# Patient Record
Sex: FEMALE | Race: WHITE | NOT HISPANIC OR LATINO
[De-identification: names, ages, dates, MRNs, and addresses within clinical notes are randomized per-mention and may not be internally consistent; named-entity substitution may affect disease eponyms.]

---

## 2018-11-09 ENCOUNTER — APPOINTMENT (OUTPATIENT)
Dept: PULMONOLOGY | Facility: CLINIC | Age: 58
End: 2018-11-09

## 2019-10-15 ENCOUNTER — APPOINTMENT (OUTPATIENT)
Dept: RHEUMATOLOGY | Facility: CLINIC | Age: 59
End: 2019-10-15

## 2019-10-15 ENCOUNTER — APPOINTMENT (OUTPATIENT)
Dept: PULMONOLOGY | Facility: CLINIC | Age: 59
End: 2019-10-15

## 2019-10-28 ENCOUNTER — APPOINTMENT (OUTPATIENT)
Dept: RHEUMATOLOGY | Facility: CLINIC | Age: 59
End: 2019-10-28

## 2020-01-17 ENCOUNTER — APPOINTMENT (OUTPATIENT)
Dept: RHEUMATOLOGY | Facility: CLINIC | Age: 60
End: 2020-01-17

## 2020-02-21 ENCOUNTER — APPOINTMENT (OUTPATIENT)
Dept: RHEUMATOLOGY | Facility: CLINIC | Age: 60
End: 2020-02-21

## 2024-01-30 ENCOUNTER — APPOINTMENT (OUTPATIENT)
Dept: PULMONOLOGY | Facility: CLINIC | Age: 64
End: 2024-01-30
Payer: COMMERCIAL

## 2024-01-30 VITALS
HEIGHT: 60 IN | WEIGHT: 217 LBS | BODY MASS INDEX: 42.6 KG/M2 | DIASTOLIC BLOOD PRESSURE: 70 MMHG | RESPIRATION RATE: 16 BRPM | SYSTOLIC BLOOD PRESSURE: 126 MMHG | OXYGEN SATURATION: 98 % | HEART RATE: 71 BPM

## 2024-01-30 DIAGNOSIS — G47.33 OBSTRUCTIVE SLEEP APNEA (ADULT) (PEDIATRIC): ICD-10-CM

## 2024-01-30 DIAGNOSIS — R60.0 LOCALIZED EDEMA: ICD-10-CM

## 2024-01-30 DIAGNOSIS — E66.01 MORBID (SEVERE) OBESITY DUE TO EXCESS CALORIES: ICD-10-CM

## 2024-01-30 DIAGNOSIS — Z87.39 PERSONAL HISTORY OF OTHER DISEASES OF THE MUSCULOSKELETAL SYSTEM AND CONNECTIVE TISSUE: ICD-10-CM

## 2024-01-30 DIAGNOSIS — Z80.8 FAMILY HISTORY OF MALIGNANT NEOPLASM OF OTHER ORGANS OR SYSTEMS: ICD-10-CM

## 2024-01-30 DIAGNOSIS — Z82.5 FAMILY HISTORY OF ASTHMA AND OTHER CHRONIC LOWER RESPIRATORY DISEASES: ICD-10-CM

## 2024-01-30 PROCEDURE — 99204 OFFICE O/P NEW MOD 45 MIN: CPT

## 2024-01-30 RX ORDER — HYDROCHLOROTHIAZIDE 12.5 MG/1
TABLET ORAL
Refills: 0 | Status: ACTIVE | COMMUNITY

## 2024-01-30 RX ORDER — DICLOFENAC SODIUM 75 MG/1
75 TABLET, DELAYED RELEASE ORAL
Refills: 0 | Status: ACTIVE | COMMUNITY

## 2024-01-30 NOTE — REVIEW OF SYSTEMS
[Negative] : Genitourinary [FreeTextEntry3] : per HPI [FreeTextEntry8] : per HPI [de-identified] : per HPI [de-identified] : per HPI

## 2024-01-30 NOTE — PHYSICAL EXAM
[General Appearance - In No Acute Distress] : no acute distress [Low Lying Soft Palate] : low lying soft palate [Elongated Uvula] : elongated uvula [Enlarged Base of the Tongue] : enlargement of the base of the tongue [IV] : IV [Heart Rate And Rhythm] : heart rate was normal and rhythm regular [] : no respiratory distress [Respiration, Rhythm And Depth] : normal respiratory rhythm and effort [Exaggerated Use Of Accessory Muscles For Inspiration] : no accessory muscle use [Auscultation Breath Sounds / Voice Sounds] : lungs were clear to auscultation bilaterally [Skin Color & Pigmentation] : normal skin color and pigmentation [Oriented To Time, Place, And Person] : oriented to person, place, and time [Impaired Insight] : insight and judgment were intact [Affect] : the affect was normal [Normal Oropharynx] : abnormal oropharynx

## 2024-01-30 NOTE — PLAN
[TextEntry] : Sleep study. Reviewed with the patient what sleep apnea is, pathophysiology of sleep apnea. Reviewed how we diagnose it and treatment options. Reviewed short and long term health consequences. Weight loss. All questions answered.

## 2024-01-30 NOTE — HISTORY OF PRESENT ILLNESS
[Daytime Somnolence] : daytime somnolence [FreeTextEntry1] : Significant EDS, heavy snoring, witnessed apneas.   ESS 11. STOPBANG 6.  BMI 42.   Retired . [ESS] : 11

## 2024-03-11 ENCOUNTER — OUTPATIENT (OUTPATIENT)
Dept: OUTPATIENT SERVICES | Facility: HOSPITAL | Age: 64
LOS: 1 days | End: 2024-03-11
Payer: COMMERCIAL

## 2024-03-11 DIAGNOSIS — G47.33 OBSTRUCTIVE SLEEP APNEA (ADULT) (PEDIATRIC): ICD-10-CM

## 2024-03-11 PROCEDURE — 95800 SLP STDY UNATTENDED: CPT | Mod: 26

## 2024-03-11 PROCEDURE — 95800 SLP STDY UNATTENDED: CPT

## 2024-04-23 ENCOUNTER — APPOINTMENT (OUTPATIENT)
Dept: PULMONOLOGY | Facility: CLINIC | Age: 64
End: 2024-04-23

## 2024-08-27 NOTE — HISTORY OF PRESENT ILLNESS
[FreeTextEntry1] : SUNDAY is a 63 year female who presents for She was referred by        Daytime frequency:  Nocturia:  Urinary urgency:  Leakage of urine with urgency:  Leakage of urine with coughing sneezing laughing:  Incontinence pad use:  Sensation of incomplete bladder emptying:  History of frequent urinary tract infections:  History of hematuria:  Previous treatment:  Vaginal symptoms:  Bowel symptoms:      OB:  GYN: LMP, pap, estrogen use  PMH:  PSH:  Meds:  Allx:

## 2024-08-27 NOTE — ADDENDUM
[FreeTextEntry1] : This note was written by Dilcia Kilpatrick, acting as the  for Dr. Mejia. This note accurately reflects the work and decisions made by Dr. Mejia.

## 2024-08-27 NOTE — DISCUSSION/SUMMARY
[FreeTextEntry1] : SUNDAY is a 63 year female who presents for On exam, negative CST, normal PVR, no POP.   [] []   f/u All questions answered.

## 2024-08-30 ENCOUNTER — APPOINTMENT (OUTPATIENT)
Dept: UROGYNECOLOGY | Facility: CLINIC | Age: 64
End: 2024-08-30